# Patient Record
Sex: FEMALE | Race: WHITE | Employment: FULL TIME | URBAN - METROPOLITAN AREA
[De-identification: names, ages, dates, MRNs, and addresses within clinical notes are randomized per-mention and may not be internally consistent; named-entity substitution may affect disease eponyms.]

---

## 2017-02-10 ENCOUNTER — HOSPITAL ENCOUNTER (OUTPATIENT)
Dept: ULTRASOUND IMAGING | Age: 52
End: 2017-02-10
Payer: COMMERCIAL

## 2017-02-10 ENCOUNTER — HOSPITAL ENCOUNTER (OUTPATIENT)
Dept: WOMENS IMAGING | Age: 52
Discharge: HOME | End: 2017-02-10
Payer: COMMERCIAL

## 2017-02-10 DIAGNOSIS — R92.8 ABNORMAL MAMMOGRAM, UNSPECIFIED: ICD-10-CM

## 2017-02-10 PROCEDURE — G0206 DX MAMMO INCL CAD UNI: HCPCS

## 2017-12-13 ENCOUNTER — APPOINTMENT (OUTPATIENT)
Dept: CT IMAGING | Age: 52
End: 2017-12-13
Payer: COMMERCIAL

## 2017-12-13 ENCOUNTER — HOSPITAL ENCOUNTER (EMERGENCY)
Age: 52
Discharge: HOME | End: 2017-12-13
Attending: EMERGENCY MEDICINE
Payer: COMMERCIAL

## 2017-12-13 VITALS
OXYGEN SATURATION: 98 % | SYSTOLIC BLOOD PRESSURE: 138 MMHG | DIASTOLIC BLOOD PRESSURE: 86 MMHG | WEIGHT: 165 LBS | HEART RATE: 73 BPM | RESPIRATION RATE: 16 BRPM | TEMPERATURE: 97.6 F | BODY MASS INDEX: 29.23 KG/M2 | HEIGHT: 63 IN

## 2017-12-13 DIAGNOSIS — F43.0 ACUTE STRESS REACTION: Primary | ICD-10-CM

## 2017-12-13 DIAGNOSIS — R90.89 ABNORMAL CT OF BRAIN: ICD-10-CM

## 2017-12-13 LAB
ALBUMIN SERPL-MCNC: 4.4 G/DL (ref 3.9–4.9)
ALP BLD-CCNC: 92 U/L (ref 40–130)
ALT SERPL-CCNC: 23 U/L (ref 0–33)
ANION GAP SERPL CALCULATED.3IONS-SCNC: 14 MEQ/L (ref 9–17)
AST SERPL-CCNC: 20 U/L (ref 0–35)
BASOPHILS ABSOLUTE: 0.1 K/UL (ref 0–0.2)
BASOPHILS RELATIVE PERCENT: 1.3 %
BILIRUB SERPL-MCNC: 0.8 MG/DL (ref 0–1.2)
BUN BLDV-MCNC: 11 MG/DL (ref 6–20)
CALCIUM SERPL-MCNC: 10 MG/DL (ref 8.6–10.2)
CHLORIDE BLD-SCNC: 90 MEQ/L (ref 97–107)
CO2: 29 MEQ/L (ref 17–24)
CREAT SERPL-MCNC: 0.68 MG/DL (ref 0.5–0.9)
EKG ATRIAL RATE: 62 BPM
EKG P AXIS: 31 DEGREES
EKG P-R INTERVAL: 164 MS
EKG Q-T INTERVAL: 466 MS
EKG QRS DURATION: 88 MS
EKG QTC CALCULATION (BAZETT): 472 MS
EKG R AXIS: 0 DEGREES
EKG T AXIS: 73 DEGREES
EKG VENTRICULAR RATE: 62 BPM
EOSINOPHILS ABSOLUTE: 0.1 K/UL (ref 0–0.7)
EOSINOPHILS RELATIVE PERCENT: 0.8 %
GFR AFRICAN AMERICAN: >60
GFR NON-AFRICAN AMERICAN: >60
GLOBULIN: 2.4 G/DL (ref 2.3–3.5)
GLUCOSE BLD-MCNC: 96 MG/DL (ref 74–109)
HCT VFR BLD CALC: 42.4 % (ref 37–47)
HEMOGLOBIN: 14.9 G/DL (ref 12–16)
LYMPHOCYTES ABSOLUTE: 2.1 K/UL (ref 1–4.8)
LYMPHOCYTES RELATIVE PERCENT: 22.6 %
MCH RBC QN AUTO: 31.8 PG (ref 27–31.3)
MCHC RBC AUTO-ENTMCNC: 35.1 % (ref 33–37)
MCV RBC AUTO: 90.5 FL (ref 82–100)
MONOCYTES ABSOLUTE: 0.7 K/UL (ref 0.2–0.8)
MONOCYTES RELATIVE PERCENT: 7.5 %
NEUTROPHILS ABSOLUTE: 6.2 K/UL (ref 1.4–6.5)
NEUTROPHILS RELATIVE PERCENT: 67.8 %
PDW BLD-RTO: 12.5 % (ref 11.5–14.5)
PLATELET # BLD: 335 K/UL (ref 130–400)
POTASSIUM SERPL-SCNC: 4.4 MEQ/L (ref 3.2–4.4)
RBC # BLD: 4.68 M/UL (ref 4.2–5.4)
SODIUM BLD-SCNC: 133 MEQ/L (ref 132–138)
TOTAL PROTEIN: 6.8 G/DL (ref 6.4–8.1)
WBC # BLD: 9.2 K/UL (ref 4.8–10.8)

## 2017-12-13 PROCEDURE — 6370000000 HC RX 637 (ALT 250 FOR IP): Performed by: EMERGENCY MEDICINE

## 2017-12-13 PROCEDURE — 80053 COMPREHEN METABOLIC PANEL: CPT

## 2017-12-13 PROCEDURE — 85025 COMPLETE CBC W/AUTO DIFF WBC: CPT

## 2017-12-13 PROCEDURE — 93005 ELECTROCARDIOGRAM TRACING: CPT

## 2017-12-13 PROCEDURE — 36415 COLL VENOUS BLD VENIPUNCTURE: CPT

## 2017-12-13 PROCEDURE — 99284 EMERGENCY DEPT VISIT MOD MDM: CPT

## 2017-12-13 PROCEDURE — 70450 CT HEAD/BRAIN W/O DYE: CPT

## 2017-12-13 RX ORDER — FEXOFENADINE HCL AND PSEUDOEPHEDRINE HCI 180; 240 MG/1; MG/1
1 TABLET, EXTENDED RELEASE ORAL DAILY
COMMUNITY
Start: 2017-12-12

## 2017-12-13 RX ORDER — OMEPRAZOLE 20 MG/1
20 CAPSULE, DELAYED RELEASE ORAL DAILY
COMMUNITY
Start: 2017-10-20

## 2017-12-13 RX ORDER — LORAZEPAM 1 MG/1
0.5 TABLET ORAL ONCE
Status: COMPLETED | OUTPATIENT
Start: 2017-12-13 | End: 2017-12-13

## 2017-12-13 RX ORDER — ATENOLOL AND CHLORTHALIDONE TABLET 100; 25 MG/1; MG/1
1 TABLET ORAL DAILY
COMMUNITY
Start: 2017-10-20

## 2017-12-13 RX ADMIN — LORAZEPAM 0.5 MG: 1 TABLET ORAL at 11:55

## 2017-12-13 ASSESSMENT — ENCOUNTER SYMPTOMS
SHORTNESS OF BREATH: 0
ABDOMINAL PAIN: 0
VOMITING: 0
TROUBLE SWALLOWING: 0
RHINORRHEA: 0
ALLERGIC/IMMUNOLOGIC NEGATIVE: 1
EYES NEGATIVE: 1
WHEEZING: 0

## 2017-12-13 NOTE — ED PROVIDER NOTES
[facility] ED  eMERGENCY dEPARTMENT eNCOUnter      Pt Name: Sebastien Clemens  MRN: [id number]  [place] 1965  Date of evaluation: 12/13/2017  Provider: Maryam Benjamin MD    [address] [place] [address]       Chief Complaint   Patient presents with    Numbness     rt hand numbness          HISTORY OF PRESENT ILLNESS   (Location/Symptom, Timing/Onset, Context/Setting, Quality, Duration, Modifying Factors, Severity)  Note limiting factors. Sebastien Clemens is a 50 y.o. female who presents to the emergency department With complaint of numbness and tingling to bilateral hands and feet and feeling woozy. Patient admits significant stressor around father is currently hospitalized and not doing well. Patient admits she took a Claritin tablet approximately 8:00 this morning on an empty stomach. Patient admits she then came into the hospital shortly before arrival to the emergency department. Patient admits any elevator ride up she became nauseated and started to feel \"woozy\". Patient states she then took some crackers on the hospital for without improvement. Patient admits that she felt as though she had increased tingling in her hands and noticed bilateral finger contractures. Patient admits she is feeling much better at this time. Patient admits her history is complicated by the fact that she has multiple benign tumors. Patient admits history of multiple previous craniotomies as well as gamma knife surgery.  at bedside admits significant underlying anxiety history. Patient denies desire to harm self or others. Patient admits all symptoms have nearly completely resolved. Review of systems noted is per patient's initial feelings prior to arrival in emergency department. HPI    Nursing Notes were reviewed. REVIEW OF SYSTEMS    (2-9 systems for level 4, 10 or more for level 5)     Review of Systems   Constitutional: Positive for appetite change. Negative for activity change, chills and fever. HENT: Positive for ear pain. Negative for congestion, rhinorrhea and trouble swallowing. Eyes: Negative. Respiratory: Negative for shortness of breath and wheezing. Cardiovascular: Negative for chest pain and leg swelling. Gastrointestinal: Negative for abdominal pain and vomiting. Endocrine: Negative. Genitourinary: Negative for dysuria, frequency and hematuria. Musculoskeletal: Negative for gait problem and neck pain. Skin: Negative. Allergic/Immunologic: Negative. Neurological: Positive for dizziness, weakness and numbness. Negative for seizures, syncope, speech difficulty and light-headedness. Hematological: Negative. Psychiatric/Behavioral: The patient is nervous/anxious. Except as noted above the remainder of the review of systems was reviewed and negative. PAST MEDICAL HISTORY   History reviewed. No pertinent past medical history. SURGICAL HISTORY     History reviewed. No pertinent surgical history. CURRENT MEDICATIONS       Previous Medications    ATENOLOL-CHLORTHALIDONE (TENORETIC) 100-25 MG PER TABLET    Take 1 tablet by mouth daily    FEXOFENADINE-PSEUDOEPHEDRINE (ALLEGRA-D 24HR) 180-240 MG PER EXTENDED RELEASE TABLET    Take 1 tablet by mouth daily    OMEPRAZOLE (PRILOSEC) 20 MG DELAYED RELEASE CAPSULE    Take 20 mg by mouth daily    VITAMIN D 1000 UNITS CAPS    Take 1 tablet by mouth daily       ALLERGIES     Dilantin [phenytoin sodium extended]    FAMILY HISTORY     History reviewed. No pertinent family history.        SOCIAL HISTORY       Social History     Social History    Marital status:      Spouse name: N/A    Number of children: N/A    Years of education: N/A     Social History Main Topics    Smoking status: Current Every Day Smoker     Packs/day: 1.00     Types: Cigarettes    Smokeless tobacco: Never Used    Alcohol use Yes      Comment: social    Drug use: No    Sexual activity: Not Asked     Other Topics Concern    None Social History Narrative    None       SCREENINGS   NIH Stroke Scale  Interval: Baseline  Level of Consciousness (1a. ): Alert  LOC Questions (1b. ): Answers both correctly  LOC Commands (1c. ): Obeys both correctly  Best Gaze (2. ): Normal  Visual (3. ): No visual loss  Facial Palsy (4. ): Normal  Motor Arm, Left (5a. ): No drift  Motor Arm, Right (5b. ): No drift  Motor Leg, Left (6a. ): No drift  Motor Leg, Right (6b. ): No drift  Limb Ataxia (7. ): Absent  Sensory (8. ): Normal  Best Language (9. ): No aphasia  Dysarthria (10. ): Normal  Extinction and Inattention (11): No neglect  Total: 0Glasgow Coma Scale  Eye Opening: Spontaneous  Best Verbal Response: Oriented  Best Motor Response: Obeys commands  Onia Coma Scale Score: 15        PHYSICAL EXAM    (up to 7 for level 4, 8 or more for level 5)     ED Triage Vitals [12/13/17 1033]   BP Temp Temp Source Pulse Resp SpO2 Height Weight   (!) 139/92 97.6 °F (36.4 °C) Oral 90 18 97 % 5' 3\" (1.6 m) 165 lb (74.8 kg)       Physical Exam   Constitutional: She is oriented to person, place, and time. She appears well-developed and well-nourished. HENT:   Head: Normocephalic and atraumatic. Eyes: Conjunctivae and EOM are normal. Pupils are equal, round, and reactive to light.   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 Acute pathology is not noted. Neck: Trachea normal, normal range of motion and full passive range of motion without pain. Neck supple. Cardiovascular: Normal rate, regular rhythm, normal heart sounds, intact distal pulses and normal pulses. Pulmonary/Chest: Effort normal and breath sounds normal.   Abdominal: Soft. Normal appearance and bowel sounds are normal.   Musculoskeletal: Normal range of motion. Neurological: She is alert and oriented to person, place, and time. Skin: Skin is warm, dry and intact. Psychiatric: She has a normal mood and affect.  Her speech is normal and behavior is normal. Judgment and thought content normal. Cognition and memory are normal.   Nursing note and vitals reviewed. DIAGNOSTIC RESULTS     EKG: All EKG's are interpreted by the Emergency Department Physician who either signs or Co-signs this chart in the absence of a cardiologist.    EKG demonstrates sinus rhythm. Rate is 62. There is no evidence of ectopy or ischemia. Essentially unremarkable EKG. RADIOLOGY:   Non-plain film images such as CT, Ultrasound and MRI are read by the radiologist. Plain radiographic images are visualized and preliminarily interpreted by the emergency physician with the below findings:    CT scan as noted by radiologist.  There are significant area right frontal region that is concerning for either postoperative changes, vasogenic edema, infarct with a punctate hemorrhage. Interpretation per the Radiologist below, if available at the time of this note:    CT Head WO Contrast   Final Result            ED BEDSIDE ULTRASOUND:   Performed by ED Physician - none    LABS:  Labs Reviewed   COMPREHENSIVE METABOLIC PANEL - Abnormal; Notable for the following:        Result Value    Chloride 90 (*)     CO2 29 (*)     All other components within normal limits   CBC WITH AUTO DIFFERENTIAL - Abnormal; Notable for the following:     MCH 31.8 (*)     All other components within normal limits       All other labs were within normal range or not returned as of this dictation. EMERGENCY DEPARTMENT COURSE and DIFFERENTIAL DIAGNOSIS/MDM:   Vitals:    Vitals:    12/13/17 1033 12/13/17 1329 12/13/17 1351   BP: (!) 139/92 138/86    Pulse: 90 97 73   Resp: 18 29 16   Temp: 97.6 °F (36.4 °C)     TempSrc: Oral     SpO2: 97% (!) 86% 98%   Weight: 165 lb (74.8 kg)     Height: 5' 3\" (1.6 m)         Patient remains absolutely symptomatically emergency department with ongoing symptoms. Patient has no cephalgia or neurologic deficit. Consultation was made with Dr. Johny Orourke, radiation oncology at [facility].

## 2017-12-13 NOTE — ED NOTES
Pt ambulated in [address] w/o distress. No s/s distress noted at this time.       Jin Mustafa RN  12/13/17 2446